# Patient Record
Sex: MALE | URBAN - METROPOLITAN AREA
[De-identification: names, ages, dates, MRNs, and addresses within clinical notes are randomized per-mention and may not be internally consistent; named-entity substitution may affect disease eponyms.]

---

## 2023-01-05 ENCOUNTER — TRANSCRIBE ORDER (OUTPATIENT)
Dept: SCHEDULING | Age: 53
End: 2023-01-05

## 2023-01-05 DIAGNOSIS — G50.0 TRIGEMINAL NEURALGIA: Primary | ICD-10-CM

## 2023-04-26 ENCOUNTER — OFFICE VISIT (OUTPATIENT)
Dept: NEUROLOGY | Age: 53
End: 2023-04-26
Payer: COMMERCIAL

## 2023-04-26 VITALS
BODY MASS INDEX: 24.26 KG/M2 | SYSTOLIC BLOOD PRESSURE: 130 MMHG | WEIGHT: 189 LBS | OXYGEN SATURATION: 99 % | RESPIRATION RATE: 20 BRPM | HEART RATE: 90 BPM | DIASTOLIC BLOOD PRESSURE: 87 MMHG | HEIGHT: 74 IN

## 2023-04-26 DIAGNOSIS — G50.0 TRIGEMINAL NEURALGIA SYNDROME: Primary | ICD-10-CM

## 2023-04-26 PROCEDURE — 99204 OFFICE O/P NEW MOD 45 MIN: CPT | Performed by: PSYCHIATRY & NEUROLOGY

## 2023-04-26 RX ORDER — ALBUTEROL SULFATE 90 UG/1
2 AEROSOL, METERED RESPIRATORY (INHALATION)
COMMUNITY

## 2023-04-26 RX ORDER — QUETIAPINE FUMARATE 100 MG/1
100 TABLET, FILM COATED ORAL
COMMUNITY

## 2023-04-26 RX ORDER — FENOFIBRATE 134 MG/1
134 CAPSULE ORAL
COMMUNITY
Start: 2022-11-01

## 2023-04-26 RX ORDER — CARBAMAZEPINE 200 MG/1
TABLET ORAL
COMMUNITY
Start: 2022-11-01

## 2023-04-26 RX ORDER — HYDROGEN PEROXIDE 3 %
SOLUTION, NON-ORAL MISCELLANEOUS
COMMUNITY

## 2023-04-26 NOTE — PROGRESS NOTES
New Patient (Patient is here for Trigeminal nerve right side facial pain for about 3 and 1/2 years. The patient stated that it happened after a dental procedure. He also TMJ. PCP prescribed the carBAMazepine (TEGretol) 200 mg tablet. Patient is thinking maybe getting a MRI.)    HPI: 47 y.o. male, NEW PATIENT, who is here to discuss the following symptoms: trigeminal neuralgia type symptoms    Pt reports that in Dec 2019, had a dental procedure (multiple fillings on right side). After that he started having severe pain in teeth when eating. By Feb 2020, symptoms had gone away. In Fall of 2020, had a dental cleaning and sometime after that had similar pain flare (tried Acupuncture for it) which subsided after 2-3 months. Symptoms recurred in Fall of 2022. Says it happened a month after a cleaning. Says changed PCPs and in Nov 2022, PCP started him on Carbamazepine, suspecting trigeminal neuralgia. Prior to starting Carbamazepine, he reports the pain was a stabbing pain ( inside mouth in right teeth or gums) but not facial pain. Triggers: Chewing, talking, eating, kissing. Since starting Carbamazepine, the pain is mostly gone, but triggered when brushing teeth or occasionally brief pain when talking. Rates average daily pain as 1-2/ 10 on the carbamazepine. Saw an OMFS/ Zoran Glover/ [facility] in Spring of 2022 and again 1 month ago. Pt says OMFS felt that the pain was a muscular issue/ trigger points on right side and not trigeminal neuralgia. EXAM: General: Head: atraumatic. Eyes: Conjunctivae clear. Vascular/ Carotid Arteries: not examined. Extremities: no edema. Skin: no rashes. Speech/ Language: normal.   Alertness: oriented x 3.  CNs: Smell: not tested. Visual Fields (II): full to confrontation. Pupils (II): not examined. Funduscopic: not examined. Extraocular movements (III, IV, VI): conjugate in all directions, no MENDOZA. Ptosis (III, VII): none.   Facial Sensation (V): intact to LT and pinprick V1-2-3 on both sides. Facial Movements (VII): symmetric at rest and on activation. Hearing (VIII): normal.  Soft palate elevation (IX): elevates symmetric. Shoulder shrug (XI): symmetric, strong. Tongue protrusion (XII): midline. Motor:  5/5 in all extremities. Sensory: intact LT in all extremities. Cerebellar: no resting, postural or intention tremor on either side . Deep Tendon Reflexes: 1+ biceps and patellars, symmetric. Plantar response: not tested. Gait: normal.  Romberg: not tested    ========================================     IMPRESSION/ PLAN:      ICD-10-CM ICD-9-CM    1. Trigeminal neuralgia syndrome  G50.0 350.1 MRI BRAIN W WO CONT      CBC W/O DIFF      METABOLIC PANEL, COMPREHENSIVE      CBC W/O DIFF      METABOLIC PANEL, COMPREHENSIVE        D/w patient that his symptoms do not fit the classic trigeminal neuralgia pattern. Discussed that this may be atypical odontalgia vs atypical trigeminal syndrome. He reports significant improvement since being on Carbamazepine. Will check MRI Brain +/- contrast to evaluate for any underlying structural (?  vascular compression of right trigeminal nerve ) that may be causing his symptoms. Continue Carbamazepine 200 mg TID (pt says he has multiple RFs from PCP). Check CBC and CMP to look for any side effect from the carbamazepine. Patient reports that he is going to [facility] in June or July of this year and anticipates returning in summer 2024. We discussed that he will call his neurologist after he has the brain MRI done and will give him results over the phone. If there are any abnormalities require further evaluation will address that at that time the MRI report is received.   Patient will schedule follow-up visit when he returns from [facility], or prior to leaving if needed.      ========================================  Vitals:    04/26/23 1433   BP: 130/87   BP 1 Location: Right upper arm   BP Patient Position: Sitting   BP Cuff Size: Adult Pulse: 90   Resp: 20   Height: 6' 2\" (1.88 m)   Weight: 85.7 kg (189 lb)   SpO2: 99%     No Known Allergies    Current Outpatient Medications:     carBAMazepine (TEGretol) 200 mg tablet, , Disp: , Rfl:     esomeprazole (NEXIUM) 20 mg capsule, Nexium, Disp: , Rfl:     QUEtiapine (SEROquel) 100 mg tablet, 1 Tablet., Disp: , Rfl:     fenofibrate micronized (LOFIBRA) 134 mg capsule, 1 Capsule., Disp: , Rfl:     albuterol (PROVENTIL HFA, VENTOLIN HFA, PROAIR HFA) 90 mcg/actuation inhaler, Take 2 Puffs by inhalation every six (6) hours as needed. , Disp: , Rfl:     chorionic gonadotropin, human 5,000 unit solr, 500 Int'l Units/oz by IntraMUSCular route once., Disp: , Rfl:     ========================================     No past medical history on file.  ========================================   No past surgical history on file.  ========================================   FHx: family history is not on file.  ========================================   SocHx:  reports that he has never smoked. He has never used smokeless tobacco. He reports current alcohol use of about 1.0 standard drink per week. He reports that he does not use drugs.

## 2023-05-31 ENCOUNTER — TELEPHONE (OUTPATIENT)
Age: 53
End: 2023-05-31

## 2023-05-31 NOTE — TELEPHONE ENCOUNTER
Patient would like a call from the nurse or doctor regarding his MRI he took early this month.     Please contact

## 2023-06-02 ENCOUNTER — PATIENT MESSAGE (OUTPATIENT)
Dept: NEUROLOGY | Age: 53
End: 2023-06-02

## 2024-08-12 ENCOUNTER — TELEPHONE (OUTPATIENT)
Age: 54
End: 2024-08-12

## 2024-08-12 NOTE — TELEPHONE ENCOUNTER
Patient is former Dr. Contreras patient, he is currently experiencing some facial pain.  His PCP prescribed Carbanazepine but it gives him bad dreams.    Pls call/advise.  [phone]

## 2024-08-13 NOTE — TELEPHONE ENCOUNTER
Called the Pt. However had to leave a voice mail asked him to call and confirm he can take this appt. With NP.         Patient is former Dr. Contreras patient, he is currently experiencing some facial pain.  His PCP prescribed Carbanazepine but it gives him bad dreams.     Pls call/advise.  [phone]

## 2024-08-22 NOTE — PROGRESS NOTES
BMI 24.01 kg/m²       CT Results (maximum last 3):  CT Result (most recent):  No results found for this or any previous visit from the past 3650 days.        MRI Results (maximum last 3):  MRI Result (most recent):  No results found for this or any previous visit from the past 3650 days.            Assessment and Plan   Luis was seen today for follow-up.    Diagnoses and all orders for this visit:    Trigeminal neuralgia  -     OXcarbazepine (TRILEPTAL) 150 MG tablet; Take 1 tablet by mouth 2 times daily  -     Comprehensive Metabolic Panel; Future  -     CBC with Auto Differential; Future    Facial pain  -     OXcarbazepine (TRILEPTAL) 150 MG tablet; Take 1 tablet by mouth 2 times daily      54 year old male without significant PMHx here for follow up with history of trigeminal neuralgia. His exam today is nonfocal and reassuring. Previously took Carbamazepine for symptoms which caused side effect of nightmares. Discussed signs and symptoms of trigeminal neuralgia and recent MRI findings at length, given duration of symptoms and classic distribution and character of pain, I do suspect trigeminal neuralgia and have low suspicion his symptoms are due to TMJ or dental etiology. Discussed alternative to Carbamazepine and agree to start Oxcabazepine 150mg BID. Potential side effects were discussed. Will check CBC, CMP and monitor while taking. Will request disc images of recent MRI for review. He can follow up with me in 3 mos, sooner PRN.     I spent 45 minutes of time today reviewing the medical record and notes, imaging, examining the patient, and face-to-face time, patient/family teaching and medication side effects, and time spent completing documentation.    HELENE MIRZA, POLLY - NP

## 2024-08-23 ENCOUNTER — OFFICE VISIT (OUTPATIENT)
Age: 54
End: 2024-08-23
Payer: COMMERCIAL

## 2024-08-23 VITALS
OXYGEN SATURATION: 99 % | HEIGHT: 74 IN | HEART RATE: 92 BPM | SYSTOLIC BLOOD PRESSURE: 138 MMHG | BODY MASS INDEX: 24 KG/M2 | WEIGHT: 187 LBS | RESPIRATION RATE: 16 BRPM | DIASTOLIC BLOOD PRESSURE: 78 MMHG

## 2024-08-23 DIAGNOSIS — G50.0 TRIGEMINAL NEURALGIA: ICD-10-CM

## 2024-08-23 DIAGNOSIS — G50.0 TRIGEMINAL NEURALGIA: Primary | ICD-10-CM

## 2024-08-23 DIAGNOSIS — R51.9 FACIAL PAIN: ICD-10-CM

## 2024-08-23 PROCEDURE — 99204 OFFICE O/P NEW MOD 45 MIN: CPT

## 2024-08-23 RX ORDER — ALBUTEROL SULFATE 90 UG/1
2 AEROSOL, METERED RESPIRATORY (INHALATION) EVERY 6 HOURS PRN
COMMUNITY

## 2024-08-23 RX ORDER — OXCARBAZEPINE 150 MG/1
150 TABLET, FILM COATED ORAL 2 TIMES DAILY
Qty: 60 TABLET | Refills: 3 | Status: SHIPPED | OUTPATIENT
Start: 2024-08-23

## 2024-08-23 RX ORDER — ESOMEPRAZOLE MAGNESIUM 40 MG/1
CAPSULE, DELAYED RELEASE ORAL AS NEEDED
COMMUNITY

## 2024-08-23 RX ORDER — QUETIAPINE FUMARATE 100 MG/1
100 TABLET, FILM COATED ORAL DAILY
COMMUNITY

## 2024-08-23 ASSESSMENT — PATIENT HEALTH QUESTIONNAIRE - PHQ9
SUM OF ALL RESPONSES TO PHQ9 QUESTIONS 1 & 2: 0
SUM OF ALL RESPONSES TO PHQ QUESTIONS 1-9: 0
2. FEELING DOWN, DEPRESSED OR HOPELESS: NOT AT ALL
1. LITTLE INTEREST OR PLEASURE IN DOING THINGS: NOT AT ALL
SUM OF ALL RESPONSES TO PHQ QUESTIONS 1-9: 0

## 2024-08-23 ASSESSMENT — ENCOUNTER SYMPTOMS
TROUBLE SWALLOWING: 0
VOICE CHANGE: 0

## 2024-08-23 NOTE — PATIENT INSTRUCTIONS
As a reminder:   Please come to your appointment 15 minutes before your office appointment.  This way, you can get checked in at the  and checked in by the nursing staff so you have the full allotment of time with your provider for your visit.  Please bring an up-to-date and accurate list of all your medications.  Or bring all your active prescription bottles with you at the time of your office visit and this includes over-the-counter medications so we can make sure that your medication list is up-to-date.  If you are scheduled for a virtual visit, please be aware that the  will need to check you in and usually the day before to verify insurance and collect co-pays as appropriate.  Please be prepared for the second call which will be from the nurse to go over your medications and any other vital information.  This will probably be done 30 minutes prior to your visit.  The reason why we do this early is that you can get the full benefit of your appointment time with your provider.  Finally you will be given the link for your virtual visit please click into your link 10 minutes prior to your appointment and please wait patiently for the provider to join you        As per discussion  It was nice to meet you. We are going to start you on a new medication, Oxcarbazepine, to help with symptoms of trigeminal neuralgia. We are going to check your labs today to make sure that you are safe to take this. Please go upstairs to the lab after your appointment. You do not need to fast for the labs. We discussed magnesium supplementation, and you can try Magnesium Oxide 400mg at night for sleep. We will see you back in the clinic in 3 mos, sooner as needed. Please contact the clinic if any side effect of oxcarbazepine or no improvement.                   Office Policies    Phone calls/patient messages:  Please allow up to 72 hours  for someone in the office to contact you about your call or message. Be mindful

## 2024-08-24 LAB
ALBUMIN SERPL-MCNC: 4.3 G/DL (ref 3.8–4.9)
ALP SERPL-CCNC: 54 IU/L (ref 44–121)
ALT SERPL-CCNC: 19 IU/L (ref 0–44)
AST SERPL-CCNC: 17 IU/L (ref 0–40)
BASOPHILS # BLD AUTO: 0 X10E3/UL (ref 0–0.2)
BASOPHILS NFR BLD AUTO: 1 %
BILIRUB SERPL-MCNC: 0.4 MG/DL (ref 0–1.2)
BUN SERPL-MCNC: 14 MG/DL (ref 6–24)
BUN/CREAT SERPL: 15 (ref 9–20)
CALCIUM SERPL-MCNC: 9.2 MG/DL (ref 8.7–10.2)
CHLORIDE SERPL-SCNC: 104 MMOL/L (ref 96–106)
CO2 SERPL-SCNC: 26 MMOL/L (ref 20–29)
CREAT SERPL-MCNC: 0.94 MG/DL (ref 0.76–1.27)
EGFRCR SERPLBLD CKD-EPI 2021: 97 ML/MIN/1.73
EOSINOPHIL # BLD AUTO: 0.2 X10E3/UL (ref 0–0.4)
EOSINOPHIL NFR BLD AUTO: 5 %
ERYTHROCYTE [DISTWIDTH] IN BLOOD BY AUTOMATED COUNT: 13 % (ref 11.6–15.4)
GLOBULIN SER CALC-MCNC: 2.5 G/DL (ref 1.5–4.5)
GLUCOSE SERPL-MCNC: 75 MG/DL (ref 70–99)
HCT VFR BLD AUTO: 45.3 % (ref 37.5–51)
HGB BLD-MCNC: 14.8 G/DL (ref 13–17.7)
IMM GRANULOCYTES # BLD AUTO: 0 X10E3/UL (ref 0–0.1)
IMM GRANULOCYTES NFR BLD AUTO: 0 %
LYMPHOCYTES # BLD AUTO: 1.3 X10E3/UL (ref 0.7–3.1)
LYMPHOCYTES NFR BLD AUTO: 28 %
MCH RBC QN AUTO: 27.7 PG (ref 26.6–33)
MCHC RBC AUTO-ENTMCNC: 32.7 G/DL (ref 31.5–35.7)
MCV RBC AUTO: 85 FL (ref 79–97)
MONOCYTES # BLD AUTO: 0.3 X10E3/UL (ref 0.1–0.9)
MONOCYTES NFR BLD AUTO: 7 %
NEUTROPHILS # BLD AUTO: 2.8 X10E3/UL (ref 1.4–7)
NEUTROPHILS NFR BLD AUTO: 59 %
PLATELET # BLD AUTO: 223 X10E3/UL (ref 150–450)
POTASSIUM SERPL-SCNC: 4.5 MMOL/L (ref 3.5–5.2)
PROT SERPL-MCNC: 6.8 G/DL (ref 6–8.5)
RBC # BLD AUTO: 5.34 X10E6/UL (ref 4.14–5.8)
SODIUM SERPL-SCNC: 142 MMOL/L (ref 134–144)
WBC # BLD AUTO: 4.6 X10E3/UL (ref 3.4–10.8)

## 2024-09-02 ENCOUNTER — OFFICE VISIT (OUTPATIENT)
Age: 54
End: 2024-09-02

## 2024-09-02 VITALS
TEMPERATURE: 98.4 F | DIASTOLIC BLOOD PRESSURE: 86 MMHG | HEART RATE: 89 BPM | RESPIRATION RATE: 20 BRPM | HEIGHT: 73 IN | BODY MASS INDEX: 24.92 KG/M2 | SYSTOLIC BLOOD PRESSURE: 121 MMHG | WEIGHT: 188 LBS | OXYGEN SATURATION: 98 %

## 2024-09-02 DIAGNOSIS — S39.012A LOW BACK STRAIN, INITIAL ENCOUNTER: Primary | ICD-10-CM

## 2024-09-02 RX ORDER — KETOROLAC TROMETHAMINE 30 MG/ML
30 INJECTION, SOLUTION INTRAMUSCULAR; INTRAVENOUS ONCE
Status: COMPLETED | OUTPATIENT
Start: 2024-09-02 | End: 2024-09-02

## 2024-09-02 RX ORDER — DICLOFENAC SODIUM 75 MG/1
75 TABLET, DELAYED RELEASE ORAL 2 TIMES DAILY
Qty: 60 TABLET | Refills: 0 | Status: SHIPPED | OUTPATIENT
Start: 2024-09-02

## 2024-09-02 RX ORDER — METHOCARBAMOL 750 MG/1
TABLET, FILM COATED ORAL
Qty: 30 TABLET | Refills: 0 | Status: SHIPPED | OUTPATIENT
Start: 2024-09-02

## 2024-09-02 RX ADMIN — KETOROLAC TROMETHAMINE 30 MG: 30 INJECTION, SOLUTION INTRAMUSCULAR; INTRAVENOUS at 13:49

## 2024-09-02 ASSESSMENT — ENCOUNTER SYMPTOMS
RESPIRATORY NEGATIVE: 1
BACK PAIN: 1

## 2024-09-02 NOTE — PROGRESS NOTES
2024   Luis Salazar (: 1970) is a 54 y.o. male, New patient, here for evaluation of the following chief complaint(s):  Other (Patient reports lower back pain x2 days. Has been using topical Voltaren without much benefit. He is going to be moving on Wednesday and doesn't want to make it worse. Low sitting car, hasn't been sitting at a desk for work (slouching more), has a HX of lower back flare-ups. )     ASSESSMENT/PLAN:  Below is the assessment and plan developed based on review of pertinent history, physical exam, labs, studies, and medications.  1. Low back strain, initial encounter  -     ketorolac (TORADOL) injection 30 mg; 30 mg, IntraMUSCular, ONCE, 1 dose, On Mon 24 at 1315Do not administer for more than 5 days    - Diclofenac - that start tomorrow   - Robaxin    Handout given with care instructions  2. OTC for symptom management. Increase fluid intake, ensure adequate nutritional intake.  3. Follow up with PCP as needed.  4. Go to ED with development of any acute symptoms.     Follow up:  Return if symptoms worsen or fail to improve.  Follow up immediately for any new, worsening or changes or if symptoms are not improving over the next 5-7 days.     SUBJECTIVE/OBJECTIVE:  HPI       Other (Patient reports lower back pain x2 days. Has been using topical Voltaren without much benefit. He is going to be moving on Wednesday and doesn't want to make it worse. Low sitting car, hasn't been sitting at a desk for work (slouching more), has a HX of lower back flare-ups. )        Review of Systems   Constitutional:  Positive for activity change. Negative for fever.   Respiratory: Negative.     Cardiovascular: Negative.    Musculoskeletal:  Positive for back pain and myalgias.   Neurological:  Negative for weakness and numbness.         Physical Exam  Constitutional:       Appearance: Normal appearance.   Cardiovascular:      Rate and Rhythm: Normal rate and regular rhythm.      Pulses: Normal pulses.

## 2024-09-29 RX ORDER — DICLOFENAC SODIUM 75 MG/1
75 TABLET, DELAYED RELEASE ORAL 2 TIMES DAILY
Qty: 60 TABLET | Refills: 0 | OUTPATIENT
Start: 2024-09-29

## 2024-11-16 DIAGNOSIS — R51.9 FACIAL PAIN: ICD-10-CM

## 2024-11-16 DIAGNOSIS — G50.0 TRIGEMINAL NEURALGIA: ICD-10-CM

## 2024-11-18 ENCOUNTER — OFFICE VISIT (OUTPATIENT)
Age: 54
End: 2024-11-18
Payer: COMMERCIAL

## 2024-11-18 VITALS
HEIGHT: 73 IN | DIASTOLIC BLOOD PRESSURE: 64 MMHG | RESPIRATION RATE: 16 BRPM | HEART RATE: 64 BPM | BODY MASS INDEX: 25.31 KG/M2 | WEIGHT: 191 LBS | SYSTOLIC BLOOD PRESSURE: 122 MMHG | OXYGEN SATURATION: 99 %

## 2024-11-18 DIAGNOSIS — G50.0 TRIGEMINAL NEURALGIA: Primary | ICD-10-CM

## 2024-11-18 PROCEDURE — 99215 OFFICE O/P EST HI 40 MIN: CPT

## 2024-11-18 RX ORDER — CICLOPIROX 80 MG/ML
SOLUTION TOPICAL
COMMUNITY
Start: 2024-08-26

## 2024-11-18 RX ORDER — OXCARBAZEPINE 150 MG/1
150 TABLET, FILM COATED ORAL 2 TIMES DAILY
Qty: 180 TABLET | Refills: 1 | OUTPATIENT
Start: 2024-11-18

## 2024-11-18 RX ORDER — METHOCARBAMOL 500 MG/1
500 TABLET, FILM COATED ORAL 2 TIMES DAILY PRN
COMMUNITY
Start: 2024-09-30

## 2024-11-18 RX ORDER — FLUVOXAMINE MALEATE 100 MG/1
100 CAPSULE, EXTENDED RELEASE ORAL DAILY
COMMUNITY
Start: 2024-10-18

## 2024-11-18 ASSESSMENT — PATIENT HEALTH QUESTIONNAIRE - PHQ9
1. LITTLE INTEREST OR PLEASURE IN DOING THINGS: NOT AT ALL
SUM OF ALL RESPONSES TO PHQ QUESTIONS 1-9: 0
2. FEELING DOWN, DEPRESSED OR HOPELESS: NOT AT ALL
SUM OF ALL RESPONSES TO PHQ9 QUESTIONS 1 & 2: 0
SUM OF ALL RESPONSES TO PHQ QUESTIONS 1-9: 0

## 2024-11-18 NOTE — PROGRESS NOTES
Neurology Clinic Follow up Note    Patient ID:   uLis Salazar  1970  54 y.o. male  [id number]      Chief Complaint   Patient presents with    Follow-up     Trigeminal neuralgia (right side)       Last Appointment With Me:    8/23/24  \" 54 year old male without significant PMHx here for follow up with history of trigeminal neuralgia. His exam today is nonfocal and reassuring. Previously took Carbamazepine for symptoms which caused side effect of nightmares. Discussed signs and symptoms of trigeminal neuralgia and recent MRI findings at length, given duration of symptoms and classic distribution and character of pain, I do suspect trigeminal neuralgia and have low suspicion his symptoms are due to TMJ or dental etiology. Discussed alternative to Carbamazepine and agree to start Oxcabazepine 150mg BID. Potential side effects were discussed. Will check CBC, CMP and monitor while taking. Will request disc images of recent MRI for review. He can follow up with me in 3 mos, sooner PRN.  \"    Interval History:   Patient reports he pursued acupuncture for [place] pain, which did not help.   Orofacial surgeon, [place], who applied a topical medication to his right jaw/teeth, which did not help.   Patient reports after taking Oxcarbazepine for one month dental pain resolved.   Reports being dissociated at times however while taking Oxcarbazepine. Later on in the day, he would feel more like himself, which he suspected may be due to the morning dose wearing off.   He is not taking Oxcarbazepine anymore, weaned himself off one week ago.   He takes Seroquel nightly for sleep. Has taken this medication for 8 years. He has recently moved to a busier area, and may not be sleeping as well at night.   Recently had a physical exam, reports he had labs which were normal.     Past Medical History:   Diagnosis Date    Hearing loss 5 years ago    notch at [employer], some tinnitus left ear     Family History   Problem Relation Age of Onset

## 2025-01-03 ENCOUNTER — TELEPHONE (OUTPATIENT)
Age: 55
End: 2025-01-03

## 2025-01-03 NOTE — TELEPHONE ENCOUNTER
Called the patient to let him know that we have to cancel his appointment on 03.10.2025 due to provider will be out of the office and if he needs to reschedule he can give us a call back however he is a patient 's of [facility] and he has already his appointment on 08.28 and we can give him a sooner appointment with NP. If he likes.

## 2025-02-14 ENCOUNTER — PATIENT MESSAGE (OUTPATIENT)
Age: 55
End: 2025-02-14

## 2025-02-27 ENCOUNTER — OFFICE VISIT (OUTPATIENT)
Age: 55
End: 2025-02-27
Payer: COMMERCIAL

## 2025-02-27 VITALS
WEIGHT: 188.5 LBS | RESPIRATION RATE: 16 BRPM | DIASTOLIC BLOOD PRESSURE: 64 MMHG | BODY MASS INDEX: 24.98 KG/M2 | HEART RATE: 92 BPM | HEIGHT: 73 IN | SYSTOLIC BLOOD PRESSURE: 122 MMHG | OXYGEN SATURATION: 98 %

## 2025-02-27 DIAGNOSIS — Z86.69 HISTORY OF TRIGEMINAL NEURALGIA: Primary | ICD-10-CM

## 2025-02-27 PROCEDURE — 99214 OFFICE O/P EST MOD 30 MIN: CPT

## 2025-02-27 RX ORDER — METHYLPHENIDATE HYDROCHLORIDE 20 MG/1
20 TABLET ORAL 2 TIMES DAILY
COMMUNITY
Start: 2025-01-18

## 2025-02-27 ASSESSMENT — PATIENT HEALTH QUESTIONNAIRE - PHQ9
SUM OF ALL RESPONSES TO PHQ QUESTIONS 1-9: 0
SUM OF ALL RESPONSES TO PHQ QUESTIONS 1-9: 0
2. FEELING DOWN, DEPRESSED OR HOPELESS: NOT AT ALL
SUM OF ALL RESPONSES TO PHQ9 QUESTIONS 1 & 2: 0
SUM OF ALL RESPONSES TO PHQ QUESTIONS 1-9: 0
SUM OF ALL RESPONSES TO PHQ QUESTIONS 1-9: 0
1. LITTLE INTEREST OR PLEASURE IN DOING THINGS: NOT AT ALL

## 2025-08-20 ENCOUNTER — OFFICE VISIT (OUTPATIENT)
Age: 55
End: 2025-08-20
Payer: COMMERCIAL

## 2025-08-20 VITALS
DIASTOLIC BLOOD PRESSURE: 92 MMHG | BODY MASS INDEX: 24.77 KG/M2 | SYSTOLIC BLOOD PRESSURE: 130 MMHG | HEIGHT: 74 IN | OXYGEN SATURATION: 98 % | WEIGHT: 193 LBS | RESPIRATION RATE: 16 BRPM | HEART RATE: 95 BPM

## 2025-08-20 DIAGNOSIS — G50.0 TRIGEMINAL NEURALGIA: Primary | ICD-10-CM

## 2025-08-20 PROCEDURE — 99214 OFFICE O/P EST MOD 30 MIN: CPT

## 2025-08-20 RX ORDER — OXCARBAZEPINE 150 MG/1
300 TABLET, FILM COATED ORAL 2 TIMES DAILY
COMMUNITY
Start: 2025-08-06

## 2025-08-20 RX ORDER — OXCARBAZEPINE 300 MG/1
TABLET, FILM COATED ORAL
Qty: 60 TABLET | Refills: 3 | Status: SHIPPED | OUTPATIENT
Start: 2025-08-20

## 2025-08-20 RX ORDER — TERBINAFINE HYDROCHLORIDE 250 MG/1
250 TABLET ORAL DAILY
COMMUNITY
Start: 2025-08-01

## 2025-08-20 ASSESSMENT — PATIENT HEALTH QUESTIONNAIRE - PHQ9
SUM OF ALL RESPONSES TO PHQ QUESTIONS 1-9: 0
2. FEELING DOWN, DEPRESSED OR HOPELESS: NOT AT ALL
SUM OF ALL RESPONSES TO PHQ QUESTIONS 1-9: 0
SUM OF ALL RESPONSES TO PHQ QUESTIONS 1-9: 0
1. LITTLE INTEREST OR PLEASURE IN DOING THINGS: NOT AT ALL
SUM OF ALL RESPONSES TO PHQ QUESTIONS 1-9: 0

## 2025-08-20 ASSESSMENT — VISUAL ACUITY: VA_NORMAL: 1
